# Patient Record
(demographics unavailable — no encounter records)

---

## 2025-01-22 NOTE — HISTORY OF PRESENT ILLNESS
[FreeTextEntry1] : 71 YO M seen 11/17/2022 as NPT. Patient seen 10/31/2019 ([facility]) for ED, asymptomatic microscopic hematuria. PSA 3.27 (19%) 10/31/2019.  He told me that he is urinating well and enjoying his 2 grandchildren. He denies any gross hematuria or dysuria, has nocturia x 1, but notes frequency since he has been working from home. He is on L-thyroxine for hypothyroidism but no  medication.  Patient has noted that ED is more of an issue and would like to resume medication for this. He has used Viagra 100 mg in the past with good result.  UA trace RBC IPSS 7 MARIANA 8 JOAN 2 Pelvic US: Prostate 30 gm, PVR 18 ml. We discussed the patient's chronic asymptomatic microscopic hematuria which was also identified today. I sent urine for culture and cytology has been evaluated in the past if this remains stable and these results return normal, then we will continue to follow it up on his next visit. As for his stable lower urinary tract symptoms, no intervention is indicated at this time based on his examination and pelvic ultrasound. As for the ED, this appears to be a more significant problem at this point than it has been in the past and we discussed resumption of therapy for this. I recommended that he try with sildenafil citrate 100 mg as needed 1 hour before sexual activity. I reviewed with him the indications risks alternatives and chances for success with this approach and the medication was ordered for him. As for the right inguinal hernia identified today. This is essentially asymptomatic and is more of an impulse than an actual hernia since I cannot feel a sac today. I did recommend that he continue to monitor this area and we reviewed signs and symptoms of incarceration or strangulation and how he should proceed if they occur. We will continue to follow this moving forward. Follow-up in 1 year if all above tests are normal.  Patient seen 5/22/2024 to reassess. He told me that his LUTS increased 2 weeks ago when he had the Flu and was increasing his H2O intake. Since stopping this, he is back to normal with nocturia x1 only. He denies any dysuria or gross hematuria.  He continues to have persistent intermittent discomfort in the right groin area after a workout, and feels that the bulge has gotten larger. It always retracts after exertion. He asked for a referral for a surgeon to evaluate this issue further. As for the ED, the sildenafil works well and he requests a refill. UA negative IPSS 3 JOAN 1 MARIANA 11 Findings today are consistent with stable enlarged prostate gland after an acute mild exacerbation stimulated by increase fluid intake while he was recovering from the flu. No intervention is indicated at this time. Because of his history of asymptomatic microscopic hematuria I did send urine today for culture and cytology. I also sent blood for PSA. As for his ED, this responds well to the sildenafil citrate and this was reordered for the patient. As for the right inguinal hernia, he was referred to Dr. Leah Montoya for further assessment and examination and or treatment as necessary. Otherwise, follow-up with me 1 year. PSA 2.64 stable. C+S negative. Cytology negative. Results to patient by phone, FU as planned 1 year.  Patient seen TODAY 1/22/2025 to reassess.

## 2025-01-22 NOTE — HISTORY OF PRESENT ILLNESS
[FreeTextEntry1] : 69 YO M seen 11/17/2022 as NPT. Patient seen 10/31/2019 ([facility]) for ED, asymptomatic microscopic hematuria. PSA 3.27 (19%) 10/31/2019.  He told me that he is urinating well and enjoying his 2 grandchildren. He denies any gross hematuria or dysuria, has nocturia x 1, but notes frequency since he has been working from home. He is on L-thyroxine for hypothyroidism but no  medication.  Patient has noted that ED is more of an issue and would like to resume medication for this. He has used Viagra 100 mg in the past with good result.  UA trace RBC IPSS 7 MARIANA 8 JOAN 2 Pelvic US: Prostate 30 gm, PVR 18 ml. We discussed the patient's chronic asymptomatic microscopic hematuria which was also identified today. I sent urine for culture and cytology has been evaluated in the past if this remains stable and these results return normal, then we will continue to follow it up on his next visit. As for his stable lower urinary tract symptoms, no intervention is indicated at this time based on his examination and pelvic ultrasound. As for the ED, this appears to be a more significant problem at this point than it has been in the past and we discussed resumption of therapy for this. I recommended that he try with sildenafil citrate 100 mg as needed 1 hour before sexual activity. I reviewed with him the indications risks alternatives and chances for success with this approach and the medication was ordered for him. As for the right inguinal hernia identified today. This is essentially asymptomatic and is more of an impulse than an actual hernia since I cannot feel a sac today. I did recommend that he continue to monitor this area and we reviewed signs and symptoms of incarceration or strangulation and how he should proceed if they occur. We will continue to follow this moving forward. Follow-up in 1 year if all above tests are normal.  Patient seen 5/22/2024 to reassess. He told me that his LUTS increased 2 weeks ago when he had the Flu and was increasing his H2O intake. Since stopping this, he is back to normal with nocturia x1 only. He denies any dysuria or gross hematuria.  He continues to have persistent intermittent discomfort in the right groin area after a workout, and feels that the bulge has gotten larger. It always retracts after exertion. He asked for a referral for a surgeon to evaluate this issue further. As for the ED, the sildenafil works well and he requests a refill. UA negative IPSS 3 JOAN 1 MARIANA 11 Findings today are consistent with stable enlarged prostate gland after an acute mild exacerbation stimulated by increase fluid intake while he was recovering from the flu. No intervention is indicated at this time. Because of his history of asymptomatic microscopic hematuria I did send urine today for culture and cytology. I also sent blood for PSA. As for his ED, this responds well to the sildenafil citrate and this was reordered for the patient. As for the right inguinal hernia, he was referred to Dr. Leah Montoya for further assessment and examination and or treatment as necessary. Otherwise, follow-up with me 1 year. PSA 2.64 stable. C+S negative. Cytology negative. Results to patient by phone, FU as planned 1 year.  Patient seen TODAY 1/22/2025 to reassess.

## 2025-03-13 NOTE — PHYSICAL EXAM
[Normal Appearance] : normal appearance [General Appearance - In No Acute Distress] : no acute distress [Edema] : no peripheral edema [] : no respiratory distress [Abdomen Tenderness] : non-tender [Abdomen Mass (___ Cm)] : no abdominal mass palpated [Abdomen Hernia] : no hernia was discovered [Costovertebral Angle Tenderness] : no ~M costovertebral angle tenderness

## 2025-03-13 NOTE — HISTORY OF PRESENT ILLNESS
[FreeTextEntry1] : 71 YO M seen 11/17/2022 as NPT. Patient seen 10/31/2019 ([facility]) for ED, asymptomatic microscopic hematuria. PSA 3.27 (19%) 10/31/2019.  He told me that he is urinating well and enjoying his 2 grandchildren. He denies any gross hematuria or dysuria, has nocturia x 1, but notes frequency since he has been working from home. He is on L-thyroxine for hypothyroidism but no  medication.  Patient has noted that ED is more of an issue and would like to resume medication for this. He has used Viagra 100 mg in the past with good result.  UA trace RBC IPSS 7 MARIANA 8 JOAN 2 Pelvic US: Prostate 30 gm, PVR 18 ml. We discussed the patient's chronic asymptomatic microscopic hematuria which was also identified today. I sent urine for culture and cytology has been evaluated in the past if this remains stable and these results return normal, then we will continue to follow it up on his next visit. As for his stable lower urinary tract symptoms, no intervention is indicated at this time based on his examination and pelvic ultrasound. As for the ED, this appears to be a more significant problem at this point than it has been in the past and we discussed resumption of therapy for this. I recommended that he try with sildenafil citrate 100 mg as needed 1 hour before sexual activity. I reviewed with him the indications risks alternatives and chances for success with this approach and the medication was ordered for him. As for the right inguinal hernia identified today. This is essentially asymptomatic and is more of an impulse than an actual hernia since I cannot feel a sac today. I did recommend that he continue to monitor this area and we reviewed signs and symptoms of incarceration or strangulation and how he should proceed if they occur. We will continue to follow this moving forward. Follow-up in 1 year if all above tests are normal.  Patient seen 5/22/2024 to reassess. He told me that his LUTS increased 2 weeks ago when he had the Flu and was increasing his H2O intake. Since stopping this, he is back to normal with nocturia x1 only. He denies any dysuria or gross hematuria.  He continues to have persistent intermittent discomfort in the right groin area after a workout, and feels that the bulge has gotten larger. It always retracts after exertion. He asked for a referral for a surgeon to evaluate this issue further. As for the ED, the sildenafil works well and he requests a refill. UA negative IPSS 3 JOAN 1 MARIANA 11 Findings today are consistent with stable enlarged prostate gland after an acute mild exacerbation stimulated by increase fluid intake while he was recovering from the flu. No intervention is indicated at this time. Because of his history of asymptomatic microscopic hematuria I did send urine today for culture and cytology. I also sent blood for PSA. As for his ED, this responds well to the sildenafil citrate and this was reordered for the patient. As for the right inguinal hernia, he was referred to Dr. Leah Montoya for further assessment and examination and or treatment as necessary. Otherwise, follow-up with me 1 year. PSA 2.64 stable. C+S negative. Cytology negative. Results to patient by phone, FU as planned 1 year.  Cancelled 1/22/2025.  Patient seen TODAY 3/13/2025 to reassess after 1-2 day of gross hematuria on 3/9/2025. This happened 1 week after colonoscopy and removal of 3 sessile polyps all </= 5 mm in Cecum, transverse colon and sigmoid colon. This was completely asymptomatic and spontaneously resolved after 2 days, never before and not since. Never smoker, no prior stone history. UA trace RBC IPSS 3 JOAN 1 MARIANA 12

## 2025-03-13 NOTE — ASSESSMENT
[FreeTextEntry1] : Findings today are consistent with a new finding of gross hematuria 1 week after uncomplicated colonoscopy and polyp removal.  This hematuria was completely asymptomatic.  I recommended full hematuria workup at this time.  Urine sent for culture and cytology.  CT scan A+P with and without contrast ordered, cystoscopy in the office with local and Nitrous Oxide anesthesia scheduled.  We reviewed the indications risks alternatives and chances for success with this approach and he is in agreement. Danyell Schmitz MD

## 2025-03-13 NOTE — LETTER BODY
[Dear  ___] : Dear  [unfilled], [Courtesy Letter:] : I had the pleasure of seeing your patient, [unfilled], in my office today. [Please see my note below.] : Please see my note below. [Consult Closing:] : Thank you very much for allowing me to participate in the care of this patient.  If you have any questions, please do not hesitate to contact me. [FreeTextEntry3] : Best Regards,   Danyell Schmitz MD

## 2025-03-20 NOTE — HISTORY OF PRESENT ILLNESS
[FreeTextEntry1] : 71 YO M seen 11/17/2022 as NPT. Patient seen 10/31/2019 ([facility]) for ED, asymptomatic microscopic hematuria. PSA 3.27 (19%) 10/31/2019.  He told me that he is urinating well and enjoying his 2 grandchildren. He denies any gross hematuria or dysuria, has nocturia x 1, but notes frequency since he has been working from home. He is on L-thyroxine for hypothyroidism but no  medication.  Patient has noted that ED is more of an issue and would like to resume medication for this. He has used Viagra 100 mg in the past with good result.  UA trace RBC IPSS 7 MARIANA 8 JOAN 2 Pelvic US: Prostate 30 gm, PVR 18 ml. We discussed the patient's chronic asymptomatic microscopic hematuria which was also identified today. I sent urine for culture and cytology has been evaluated in the past if this remains stable and these results return normal, then we will continue to follow it up on his next visit. As for his stable lower urinary tract symptoms, no intervention is indicated at this time based on his examination and pelvic ultrasound. As for the ED, this appears to be a more significant problem at this point than it has been in the past and we discussed resumption of therapy for this. I recommended that he try with sildenafil citrate 100 mg as needed 1 hour before sexual activity. I reviewed with him the indications risks alternatives and chances for success with this approach and the medication was ordered for him. As for the right inguinal hernia identified today. This is essentially asymptomatic and is more of an impulse than an actual hernia since I cannot feel a sac today. I did recommend that he continue to monitor this area and we reviewed signs and symptoms of incarceration or strangulation and how he should proceed if they occur. We will continue to follow this moving forward. Follow-up in 1 year if all above tests are normal.  Patient seen 5/22/2024 to reassess. He told me that his LUTS increased 2 weeks ago when he had the Flu and was increasing his H2O intake. Since stopping this, he is back to normal with nocturia x1 only. He denies any dysuria or gross hematuria.  He continues to have persistent intermittent discomfort in the right groin area after a workout, and feels that the bulge has gotten larger. It always retracts after exertion. He asked for a referral for a surgeon to evaluate this issue further. As for the ED, the sildenafil works well and he requests a refill. UA negative IPSS 3 JOAN 1 MARIANA 11 Findings today are consistent with stable enlarged prostate gland after an acute mild exacerbation stimulated by increase fluid intake while he was recovering from the flu. No intervention is indicated at this time. Because of his history of asymptomatic microscopic hematuria I did send urine today for culture and cytology. I also sent blood for PSA. As for his ED, this responds well to the sildenafil citrate and this was reordered for the patient. As for the right inguinal hernia, he was referred to Dr. Leah Montoya for further assessment and examination and or treatment as necessary. Otherwise, follow-up with me 1 year. PSA 2.64 stable. C+S negative. Cytology negative. Results to patient by phone, FU as planned 1 year.  Cancelled 1/22/2025.  Patient seen 3/13/2025 to reassess after 1-2 day of gross hematuria on 3/9/2025. This happened 1 week after colonoscopy and removal of 3 sessile polyps all </= 5 mm in Cecum, transverse colon and sigmoid colon. This was completely asymptomatic and spontaneously resolved after 2 days, never before and not since. Never smoker, no prior stone history. UA trace RBC IPSS 3 JOAN 1 MARIANA 12 Findings today are consistent with a new finding of gross hematuria 1 week after uncomplicated colonoscopy and polyp removal. This hematuria was completely asymptomatic. I recommended full hematuria workup at this time. Urine sent for culture and cytology. CT scan A+P with and without contrast ordered, cystoscopy in the office with local and Nitrous Oxide anesthesia scheduled. We reviewed the indications risks alternatives and chances for success with this approach and he is in agreement.  BUN 18 CRE 1.14 eGFR 69 C+S, cytology both negative.  CT scan 3/15/2025: IMPRESSION: Prostatomegaly with median lobe hypertrophy, as above. 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 No hydronephrosis, radiopaque calculi or enhancing renal mass.  Patient seen TODAY 3/20/2025 for cystoscopy. He denies any interval gross hematuria but continues to have LUTS, mostly nocturia. UA negative. CYSTOSCOPY: No intrinsic bladder lesions,  trilobar obstructing nodular prostate with a large intravesical lobe. Irregular, suspicious area of prostatic urethra on the left lateral lobe at the level of the veru.

## 2025-03-20 NOTE — ASSESSMENT
[FreeTextEntry1] : Cystoscopy today demonstrates a suspicious area of prostatic mucosa as outlined above along with a very large and obstructing prostate gland with a very large intravesical protrusion.  I recommended that the patient have a TURP of the prostatic mucosa noted above and possibly at the same time we will also address this large prostate gland.  To this end I recommended that he see my partner Dr. Price for further assessment and treatment.  Appointment was made and the patient is in agreement with this approach. Danyell Schmitz MD

## 2025-03-20 NOTE — ASSESSMENT
[FreeTextEntry1] : Cystoscopy today demonstrates a suspicious area of prostatic mucosa as outlined above along with a very large and obstructing prostate gland with a very large intravesical protrusion.  I recommended that the patient have a TURP of the prostatic mucosa noted above and possibly at the same time we will also address this large prostate gland.  To this end I recommended that he see my partner Dr. Price for further assessment and treatment.  Appointment was made and the patient is in agreement with this approach. Danyell Schmitz MD no abdominal distension/no blood in stool/no burning urination/no diarrhea/no dysuria/no fever/no hematuria/no nausea/no vomiting

## 2025-03-21 NOTE — LETTER BODY
[Dear  ___] : Dear  [unfilled], [Courtesy Letter:] : I had the pleasure of seeing your patient, [unfilled], in my office today. [Please see my note below.] : Please see my note below. [Consult Closing:] : Thank you very much for allowing me to participate in the care of this patient.  If you have any questions, please do not hesitate to contact me. [Sincerely,] : Sincerely, [FreeTextEntry3] : Kayla Price MD

## 2025-03-21 NOTE — HISTORY OF PRESENT ILLNESS
[FreeTextEntry1] : 70 year old male presents after recent evaluation for gross hematuria with Dr. Schmitz. Hematuria occurred 1 week after colonoscopy. CT urogram was normal. The cysto showed an area of prostatic regrowth with area that Dr Schmitz was concerned about.   Has history of TURP 20 years ago with Dr Schmitz. At that time was having difficulty urinating and then after surgery his symptoms improved. Has not had issues urinating since then. Not taking prostate medications at this time.   Has some frequency and urgency. Drinks a lot of coffee. Not interested in medications unless necessary.   FH: uncle prostate cancer

## 2025-03-21 NOTE — ASSESSMENT
[FreeTextEntry1] : 70 year old male presents with BPH and gross hematuria.   Discussed that the findings on the cystoscopy represent regrowth of prostate tissue after prior TURP and have typical appearance of post-procedure tissue. Low concern for cancer. Since he is not very symptomatic from the urinary symptoms, no absolute indication to proceed with a BPH procedure. Recommend cutting back on coffee for now and observing symptoms. If recurrent hematuria, can consider finasteride. He will f/u in 3 months.   - RTC 3 months (IPSS, UA, PVR)